# Patient Record
Sex: FEMALE | Race: WHITE | Employment: OTHER | URBAN - METROPOLITAN AREA
[De-identification: names, ages, dates, MRNs, and addresses within clinical notes are randomized per-mention and may not be internally consistent; named-entity substitution may affect disease eponyms.]

---

## 2020-11-11 ENCOUNTER — OFFICE VISIT (OUTPATIENT)
Dept: NEUROLOGY | Age: 55
End: 2020-11-11
Payer: COMMERCIAL

## 2020-11-11 VITALS — TEMPERATURE: 97.2 F

## 2020-11-11 DIAGNOSIS — F90.2 ATTENTION DEFICIT HYPERACTIVITY DISORDER (ADHD), COMBINED TYPE: ICD-10-CM

## 2020-11-11 DIAGNOSIS — F41.9 ANXIETY AND DEPRESSION: ICD-10-CM

## 2020-11-11 DIAGNOSIS — F32.A ANXIETY AND DEPRESSION: ICD-10-CM

## 2020-11-11 DIAGNOSIS — R41.3 MEMORY IMPAIRMENT OF GRADUAL ONSET: Primary | ICD-10-CM

## 2020-11-11 PROCEDURE — 96116 NUBHVL XM PHYS/QHP 1ST HR: CPT | Performed by: PSYCHOLOGIST

## 2020-11-11 NOTE — PROGRESS NOTES
This note will not be viewable in [employer] [employer] for the following reason(s). Likely risk of substantial harm from Misinterpretation of data generated by this evaluation. She will receive a full written copy of her report at the feedback session. [facility] at [address]    Office:  [phone]  Fax: [phone]                 Initial Office Exam    Patient Name: Cassi Mcpherson  Age: 48 y.o. Gender: female   Occupation:   Handedness: right handed   Presenting Concern: Memory loss  Primary Care Physician: Breanne Caraballo MD  Referring Provider: Norma Coleman MD      REASON FOR REFERRAL:  This comprehensive and medically necessary neuropsychological assessment was requested to assist with a differential diagnosis of memory loss. The use and purpose of this examination, as well as the extent and limitations of confidentiality, were explained prior to obtaining permission to participate. Instructions were provided regarding the necessity to put forth optimal effort and answer questions truthfully in order to obtain reliable and accurate test results. PERTINENT HISTORY:  Ms. Lambert Cornejo presented for a neuropsychological assessment at the recommendation of her treating physician secondary to complaints of gradual memory loss over the last five years. She has reported symptoms that include decreased concentration, not completing projects in a timely manner, increased use of post-a-notes, disorganization, increase anxiety and tension, difficulty with sleep, irritability, and decreased tolerance to frustration. Ms. Lambert Cornejo began noticing symptoms following 5 years ago.   From a brief review of her medical and personal history there has not been any other significant neurological injury or illness noted or reported. She did report experiencing depression or anxiety in the past.      Ms. Melchor Dinero does not  report any problems at birth or difficulties meeting developmental milestones. She reports that she had an ETOH family environment that was best characterized as abusve. She has a son who is dx. ADHD. Ms. Melchor Dinero does not  report being retain in school or receiving special assistance in any of she classes or subjects. Ms. Melchor Dinero completed 16 years of education. She is now work as a . Ms. Melchor Dinero does  exercise on a regular basis and does  maintain a balanced diet. She does  report problems with sleep and does  complain of pain. She does  participate in mentally stimulating activities. Ms. Melchor Dinero does not  have did not report specific concerns or stressors. Ms. Melchor Dinero indicated that she is independent in her instrumental activities of daily living, including shopping, meal preparation, housekeeping, doing laundry, driving a car, managing medications, and finances. No current outpatient medications on file. No current facility-administered medications for this visit. No past medical history on file. No flowsheet data found. No data recorded    No past surgical history on file. Social History     Socioeconomic History    Marital status:      Spouse name: Not on file    Number of children: Not on file    Years of education: Not on file    Highest education level: Not on file       No family history on file. CT Results (most recent):  No results found for this or any previous visit. MRI Results (most recent):  No results found for this or any previous visit.          MENTAL STATUS:    Orientation:  Fully oriented   Eye Contact:  Appropriate   Motor Behavior:   Ambulates independently   Speech:   Fluent and intelligible   Thought Process:  Goal-directed   Thought Content:  No evidence of hallucinations or delusions   Suicidal ideations:  Denies   Mood: Depressed and anxious mood   Affect:   Congruent with stated mood   Concentration:   Within normal limits   Abstraction:   Within normal limits   Insight:   Adequate     On the Modified Mini-Mental Status Exam: 89/100 (Low Average)      DIAGNOSTIC IMPRESSIONS:    ICD-10-CM ICD-9-CM    1. Memory impairment of gradual onset  R41.3 780.93    2. Anxiety and depression  F41.9 300.00     F32.9 311    3. Attention deficit hyperactivity disorder (ADHD), combined type -Provisional F90.2 314.01              PLAN:  1. Complete a comprehensive neuropsychological assessment to provide a differential diagnosis of presenting concerns as well as to assist with disposition and treatment planning as appropriate. 2. Consider compensatory and remedial cognitive training. 34994 x 1 Review of records. Face to face interview w/ patient. Determine test protocol: 60 minutes. Total 1 unit        Frankey Kinnier, PhD, ABPP, LCP  Licensed Clinical Psychologist/ Neuropsychologist        This note will not be viewable in [address]

## 2020-11-13 ENCOUNTER — OFFICE VISIT (OUTPATIENT)
Dept: NEUROLOGY | Age: 55
End: 2020-11-13
Payer: COMMERCIAL

## 2020-11-13 DIAGNOSIS — R41.89 COGNITIVE DECLINE: ICD-10-CM

## 2020-11-13 DIAGNOSIS — F32.A ANXIETY AND DEPRESSION: Primary | ICD-10-CM

## 2020-11-13 DIAGNOSIS — F90.2 ATTENTION DEFICIT HYPERACTIVITY DISORDER (ADHD), COMBINED TYPE: ICD-10-CM

## 2020-11-13 DIAGNOSIS — F34.1 DYSTHYMIC DISORDER: ICD-10-CM

## 2020-11-13 DIAGNOSIS — F41.9 ANXIETY AND DEPRESSION: Primary | ICD-10-CM

## 2020-11-13 PROCEDURE — 96132 NRPSYC TST EVAL PHYS/QHP 1ST: CPT | Performed by: PSYCHOLOGIST

## 2020-11-13 PROCEDURE — 96137 PSYCL/NRPSYC TST PHY/QHP EA: CPT | Performed by: PSYCHOLOGIST

## 2020-11-13 PROCEDURE — 96136 PSYCL/NRPSYC TST PHY/QHP 1ST: CPT | Performed by: PSYCHOLOGIST

## 2020-11-13 PROCEDURE — 96139 PSYCL/NRPSYC TST TECH EA: CPT | Performed by: PSYCHOLOGIST

## 2020-11-13 PROCEDURE — 96133 NRPSYC TST EVAL PHYS/QHP EA: CPT | Performed by: PSYCHOLOGIST

## 2020-11-13 PROCEDURE — 96138 PSYCL/NRPSYC TECH 1ST: CPT | Performed by: PSYCHOLOGIST

## 2020-11-13 NOTE — LETTER
11/19/20 Patient: Erlin Delgado YOB: 1965 Date of Visit: 11/13/2020 Eleanor Moore MD 
[facility] Facsimile: [phone] Dear Eleanor Moore MD, Thank you for referring Ms. Erlin Delgado to [address] [place] [address] for evaluation. My notes for this consultation are attached. If you have questions, please do not hesitate to call me. I look forward to following your patient along with you.  
 
 
Sincerely, 
 
Abelardo Heard, PHD

## 2020-11-17 NOTE — PROGRESS NOTES
This note will not be viewable in [employer] for the following reason(s). Likely risk of substantial harm from Misinterpretation of data generated by this evaluation. She will receive a full written copy of her report at the feedback session. [facility] at [address]    Office:  [phone]  Fax: [phone]                                            Neuropsychological Evaluation Report      Patient Name: Charley Savage  Age: 48 y.o. Gender: female   Occupation:   Handedness: right handed   Presenting Concern: Memory loss  Primary Care Physician: Maddi Alcantar MD  Referring Provider: Amparo Mary MD       PATIENT HISTORY (OBTAINED DURING INITIAL CLINICAL EVALUATION):    REASON FOR REFERRAL:  This comprehensive and medically necessary neuropsychological assessment was requested to assist with a differential diagnosis of memory loss. The use and purpose of this examination, as well as the extent and limitations of confidentiality, were explained prior to obtaining permission to participate. Instructions were provided regarding the necessity to put forth optimal effort and answer questions truthfully in order to obtain reliable and accurate test results.     PERTINENT HISTORY:  Ms. James Ga presented for a neuropsychological assessment at the recommendation of her treating physician secondary to complaints of gradual memory loss over the last five years. She has reported symptoms that include decreased concentration, not completing projects in a timely manner, increased use of post-a-notes, disorganization, increase anxiety and tension, difficulty with sleep, irritability, and decreased tolerance to frustration. Ms. James Ga began noticing symptoms following 5 years ago.   From a brief review of her medical and personal history there has not been any other significant neurological injury or illness noted or reported. She did report experiencing depression or anxiety in the past.       Ms. James Ga does not  report any problems at birth or difficulties meeting developmental milestones. She reports that she had an ETOH family environment that was best characterized as abusve. She has a son who is dx. ADHD. Ms. James Ga does not  report being retain in school or receiving special assistance in any of she classes or subjects. Ms. James Ga completed 16 years of education. She is now work as a .     Ms. James Ga does  exercise on a regular basis and does  maintain a balanced diet. She does  report problems with sleep and does  complain of pain. She does  participate in mentally stimulating activities. Ms. James Ga does not  have did not report specific concerns or stressors. Ms. James Ga indicated that she is independent in her instrumental activities of daily living, including shopping, meal preparation, housekeeping, doing laundry, driving a car, managing medications, and finances. METHODS OF ASSESSMENT (Current Evaluation):  Clinician Administered:  Clinical Interview  Review of Medical Records  Clock Drawing Task  Modified Mini-Mental Status Exam (3MS)  Test of Premorbid Functioning  Locke Depression Inventory-2 (BDI-2)        State-Trait Anxiety Inventory (STAXI)  Personality Assessment Inventory (CRYSTAL)  Revised Memory and Behavior Checklist    Technician Administered:   CNS Vital Signs (CNS-VS)  Neuropsychological Assessment Battery 1   NAB: Attention Module   NAB: Executive Functions Module   NAB: Language Module   NAB: Memory Module   NAB: Spatial Module  Paced Auditory Serial Addition Task  Structured Inventory of Malingering Symptoms (FIGUEROA)  Test of Memory Malingering  Test of Practical Judgment (9-Item)  Trail Making Test    TEST OBSERVATIONS:  Ms. James Ga arrived promptly for the testing session.   Dress and grooming were appropriate; physical presentation was unchanged from that observed during the clinical interview. Speech was fluent, intelligible, and goal-directed. Affect was congruent with the euthymic mood conveyed. Ms. Duane jeter was adequately cooperative and appeared to put forth good effort throughout this examination. Rapport with the examiner was adequately established and maintained. Minimal prompting was required. Comprehension of test instructions was not problematic. Performance motivation was objectively measured by three instruments (TOMM, FIGUEROA, Reliable Digit Span), and Ms. Duane jeter produced a normal score on two of these measures. Accordingly, test findings below are of questionable validity. There are also indications of inconsistent effort and the increased possibility of distortion on subjective measures. Given the above observations, plus comments contained in the Mental Status section, the results should not be considered conclusive. TEST RESULTS:  Quantitative test results are derived from comparisons to age and education corrected cohort normative data, where applicable. Percentiles are included in these instances. Qualitative test results are determined using clinical observations. General Orientation and Awareness:       Orientation to person, year, month, day of month, day of week, state, town, and circumstance.    Awareness of deficits: Decreased                  Cognitive performance validity testing:  Questionable validity        Attention/Concentration:      Classification:  Simple visuomotor tracking (<0.1 percentile)               Severely Impaired  Digits forward (8 percentile)                 Mildly Impaired  Digits backward (46 percentile)                 Average  Visual scanning (1 percentile)                            Severely Impaired  Simple information processing  efficiency (<1 percentile)  Severely Impaired   Complex information processing efficiency (<1 percentile)  Severely Impaired  Attention to visual detail of driving scenes (<1 percentile)             Severely Impaired   PASAT 200 (74 percentile)      Average          CNS -VS:   Invalid    Visuospatial and Constructional Praxis:     Visual discrimination (4 percentile)                            Moderately Impaired   Design construction (27 percentile)                 Average  Figure drawing copy (99 percentile)                                                  Very Superior     Language:         Expressive speech in oral production (10 percentile)       Low Average  Auditory comprehension (69 percentile)                   Average   Naming (<1 percentile)          Severely Impaired  Reading comprehension (50 percentile)        Average   Writing (54 percentile)                     Average   Bill payment task (50 percentile)                    Average    Memory and Learning:       Word list immediate recall (18 percentile)                Low Average  Word list short delayed recall (18 percentile)                Low Average  Word list long delayed recall (1 percentile)                           Severely Impaired  Shape learning immediate recognition (8 percentile)    Mildly Impaired    Shape learning delayed recognition (34 percentile)               Average  Story learning immediate recall (4 percentile)                Moderately Impaired  Story learning delayed recall (1 percentile)                           Severely Impaired  Daily living memory immediate recall (4 percentile)               Moderately Impaired  Daily living memory delayed recall (<1 percentile)               Severely Impaired    Cognitive Tests of Executive Functioning:     Trail Making B (0.4 percentile)                  Severely Impaired  Mazes (14 percentile)                   Low Average  Simple judgment in daily decision making (10 percentile)              Low Average  Complex judgment in daily decision making (1 percentile)               Severely Impaired  Categories (12 percentile)                  Low Average  Word generation (62 percentile)                             Average      Intellectual and Basic Cognitive Functioning (WAIS-IV):  ACS Test of pre-morbid functioning reading recognition lower limit estimated WAIS-IV FSIQ score:       Estimated full scale IQ:                  89 percentile       High Average     Emotional Functioning:   BDI-2:   28    Moderate Depression   STAXI:   58   Significant Anxiety     CRYSTAL:    Ms. Nito Montano was administered a CRYSTAL at the time of her evaluation to determine her emotional status. Examination of the validity scales indicated indicators falling outside of the normal range suggesting that she may not have answered in a completely forthright matter leading to in accurate impression based on her style of responding. Her response pattern contains a number of subtle indications that she may have been hesitant to acknowledge limits to her capabilities and she may deny any dissatisfaction, distress, or undesirable consequences that might arise from such limitations. This tendency could lead her to make an effort to downplay or minimize areas which are less than optimal.  While this defensiveness may not necessarily impair the interpretability of her test results, it does raise the possibility of some distortion. Her clinical profile suggest noteworthy for carry peculiarities in her thinking and experience. Her pattern of responses suggest that her thought processes are likely to be marked by confusion, indecision, distractibility and difficulty concentrating. Additionally she is likely to have an activity level that is perceptibly high to most observers. She may be involved in a wide variety of activities in a somewhat disorganized manner and may be experiencing accelerated thought processes. There is evidence of difficulties consistent with mild levels of depression as well as anxiety and stress.   She may be seen by others as somewhat of a perfectionist.  She is likely to be fairly rigid individual who follows her personal guidelines for conduct in an inflexible and unyielding manner. She ruminates about matters to the degree that she is often has difficulty making decisions and perceiving the larger significance of decisions that are made. Changes in routine, unexpected events, and contradictory information are likely to generate untoward stress. IMPRESSIONS:  Ms. Melchor Dinero was seen for a comprehensive neuropsychological evaluation and administered a battery of measures assessing the neurocognitive domains of attention, visual-spatial, language, memory, and executive functioning. Examination of a variety of effort and validity measures indicate that Ms. Melchor Dinero may not have approached this assessment and the most optimal manner. There are indications of inconsistencies in her effort, overreporting of symptoms and in some cases underreporting limits or functioning that might be less than optimal.  As such there is questionable validity to this assessment. There is clear indications of difficulties in her thought processes that are marked by confusion, indecision, distractibility, and difficulty concentrating. These appeared to be the result of emotional distress, rather than result of an organic dysfunction. Her overall level of cognitive performance was in the moderately impaired range. This, unfortunately, is marked by several difficulties in her performance. On the CNS vital signs (a computer-assisted battery of neuropsychological measures) she invalidated all of the measures. On measures of attention and concentration she was adequately able to perform on measures of working memory and rapid information processing, but was significantly impaired on other measures of information processing, visual attention to detail, and visual scanning. This is an unusual and inconsistent finding.   Her visual-spatial skills overall were in the average range, but again inconsistent findings were noted.  On a basic visual discrimination task she she performed in the moderately impaired range, but on constructional praxis tasks she performed in the very superior range and average. As these measures are highly correlated and the disparity in performance seems somewhat atypical.  Her performance on measures of language functioning were in the average range with all but one measure (confrontational naming) falling in the low average to average range. Her low confrontational naming performance again is unusual given her college education. Her performance on measures of memory functioning was significantly impaired. This was especially surprising given that she continues to be actively employed as a . It is unclear how she would be able to function in this position with her level of performance on these measures. On measures of executive functioning, Ms. Mao performed in the average range on measures of visual planning, simple judgment, categorical reasoning, and word fluency. In contrast, her performance on measures of cognitive flexibility and complex judgment in decision making was severely impaired. Again, given her performance as , it seems inconsistent that she could function effectively and be severely impaired in these areas. DIAGNOSTIC IMPRESSIONS:    ICD-10-CM ICD-9-CM    1. Anxiety and depression  F41.9 300.00 LA NEUROPSYCHOLOGICAL TST EVAL PHYS/QHP 1ST HOUR    F32.9 311 LA NEUROPSYCHOLOGICAL TST EVAL PHYS/QHP EA ADDL HR      LA PSYL/NRPSYCL TST PHYS/QHP 2+ TST 1ST 30 MIN      LA PSYCL/NRPSYCL TST PHYS/QHP 2+ TST EA ADDL 30 MIN      LA PSYCL/NRPSYCL TST PHYS/QHP 2+ TST EA ADDL 30 MIN      LA PSYCL/NRPSYCL TST TECH 2+ TST 1ST 30 MIN   2.  Attention deficit hyperactivity disorder (ADHD), combined type, not supported F90.2 314.01 LA NEUROPSYCHOLOGICAL TST EVAL PHYS/QHP 1ST HOUR      LA NEUROPSYCHOLOGICAL TST EVAL PHYS/QHP EA ADDL HR      LA PSYL/NRPSYCL TST PHYS/QHP 2+ TST 1ST 30 MIN      MI PSYCL/NRPSYCL TST PHYS/QHP 2+ TST EA ADDL 30 MIN      MI PSYCL/NRPSYCL TST PHYS/QHP 2+ TST EA ADDL 30 MIN      MI PSYCL/NRPSYCL TST TECH 2+ TST 1ST 30 MIN      MI PSYCL/NRPSYCL TST TECH 2+ TST EA ADDL 30 MIN      MI PSYCL/NRPSYCL TST TECH 2+ TST EA ADDL 30 MIN      MI PSYCL/NRPSYCL TST TECH 2+ TST EA ADDL 30 MIN      MI PSYCL/NRPSYCL TST TECH 2+ TST EA ADDL 30 MIN      MI PSYCL/NRPSYCL TST TECH 2+ TST EA ADDL 30 MIN   3. Dysthymic disorder  F34.1 300.4 MI NEUROPSYCHOLOGICAL TST EVAL PHYS/QHP 1ST HOUR      MI NEUROPSYCHOLOGICAL TST EVAL PHYS/QHP EA ADDL HR      MI PSYL/NRPSYCL TST PHYS/QHP 2+ TST 1ST 30 MIN      MI PSYCL/NRPSYCL TST PHYS/QHP 2+ TST EA ADDL 30 MIN      MI PSYCL/NRPSYCL TST PHYS/QHP 2+ TST EA ADDL 30 MIN      MI PSYCL/NRPSYCL TST TECH 2+ TST 1ST 30 MIN   4. Cognitive decline  R41.89 294.9 MI NEUROPSYCHOLOGICAL TST EVAL PHYS/QHP 1ST HOUR      MI NEUROPSYCHOLOGICAL TST EVAL PHYS/QHP EA ADDL HR      MI PSYL/NRPSYCL TST PHYS/QHP 2+ TST 1ST 30 MIN      MI PSYCL/NRPSYCL TST PHYS/QHP 2+ TST EA ADDL 30 MIN      MI PSYCL/NRPSYCL TST PHYS/QHP 2+ TST EA ADDL 30 MIN      MI PSYCL/NRPSYCL TST TECH 2+ TST 1ST 30 MIN      MI PSYCL/NRPSYCL TST TECH 2+ TST EA ADDL 30 MIN      MI PSYCL/NRPSYCL TST TECH 2+ TST EA ADDL 30 MIN      MI PSYCL/NRPSYCL TST TECH 2+ TST EA ADDL 30 MIN      MI PSYCL/NRPSYCL TST TECH 2+ TST EA ADDL 30 MIN      MI PSYCL/NRPSYCL TST TECH 2+ TST EA ADDL 30 MIN   R/O Possible Mixed Personality Disorder      RECOMMENDATIONS:   1. Findings should be reviewed with Ms. Mao to insure her understanding and discuss the potential implications. 2. Emphasis should be placed on Ms. Doran Dane obtaining good sleep hygiene and maintaining adequate physical exercise to promote good brain health. 3. Ms. Andreea Rivera may benefit from a referral to Psychiatry and Clinical Psychology for psychotherapy to address anxiety/ depression issues.         Thank you for allowing me the opportunity to assist you in Ms. Mao's care. Please do not hesitate to contact me should you have additional questions that I may not have addressed. 76706 x 1  96138 x 1  96139 x 6  96132 x 1  96133 x 2          Noy Quintero, Ph.D., ABPP  Licensed Clinical Psychologist  Neuropsychologist  Board Certified Rehabilitation Psychologist      Time Documentation:     86850 x 1: Neurobehavioral Status Exam/Clinical Interview: (1 hour, (already billed on first date of service)     17964*3 Neuropsych testing/data gathering by Neuropsychologist (35 additional minutes, see above)      96138 x 1  96139 x 6 Test Administration/Data Gathering By Technician: (3.5 hours). 78438 x 1 (first 30 minutes), 87295 x 7 (each additional 30 minutes)     96132 x 1  96133 x 1 Testing Evaluation Services by Neuropsychologist (1 hour, 50 minutes) 96132 x 1 (first hour), 96133 x 1 (50 minutes)     Definitions:       21649/44202:  Neurobehavioral Status Exam, Clinical interview. Clinical assessment of thinking, reasoning and judgment, by neuropsychologist, both face to face time with patient and time interpreting those test results and reporting, first and subsequent hours)     38000/72880: Neuropsychological Test Administration by Technician/Psychometrist, first 30 minutes and each additional 30 minutes. The above includes: Record review. Review of history provided by patient. Review of collaborative information. Testing by Clinician. Review of raw data. Scoring. Report writing of individual tests administered by Clinician. Integration of individual tests administered by psychometrist with NSE/testing by clinician, review of records/history/collaborative information, case Conceptualization, treatment planning, clinical decision making, report writing, coordination Of Care.  Psychometry test codes as time spent by psychometrist administering and scoring neurocognitive/psychological tests under supervision of neuropsychologist.  Integral services including scoring of raw data, data interpretation, case conceptualization, report writing etcetera were initiated after the patient finished testing/raw data collected and was completed on the date the report was signed. This note will not be viewable in [address]

## 2020-12-02 ENCOUNTER — TELEPHONE (OUTPATIENT)
Dept: NEUROLOGY | Age: 55
End: 2020-12-02

## 2020-12-02 NOTE — TELEPHONE ENCOUNTER
----- Message from Karlo Sadler sent at 12/2/2020  2:27 PM EST -----  Regarding: Dr. Quintero/ Telephone  General Message/Vendor Calls    Caller's first and last name: Patient      Reason for call: Test Results      Callback required yes/no and why: Yes.  To advise      Best contact number(s): [phone]      Details to clarify the request:Patient would like a call back to advise if an appointment should be scheduled to discuss results      Lena Sadler

## 2021-01-04 ENCOUNTER — OFFICE VISIT (OUTPATIENT)
Dept: NEUROLOGY | Age: 56
End: 2021-01-04
Payer: COMMERCIAL

## 2021-01-04 DIAGNOSIS — F41.9 ANXIETY AND DEPRESSION: Primary | ICD-10-CM

## 2021-01-04 DIAGNOSIS — R41.89 COGNITIVE DECLINE: ICD-10-CM

## 2021-01-04 DIAGNOSIS — F34.1 DYSTHYMIC DISORDER: ICD-10-CM

## 2021-01-04 DIAGNOSIS — F32.A ANXIETY AND DEPRESSION: Primary | ICD-10-CM

## 2021-01-04 PROCEDURE — 90832 PSYTX W PT 30 MINUTES: CPT | Performed by: PSYCHOLOGIST

## 2021-01-04 NOTE — PROGRESS NOTES
Davon Daigle is a 48 y.o. female who presents today for feedback following recent neuropsychological testing. The results were reviewed of the recent neuropsychological evaluation, including discussing individual tests as well as the patient's areas of neurocognitive strength versus their weaknesses. Education was provided regarding my diagnostic impressions, and we discussed next steps for further evaluation down the road. I also answered numerous questions related to the clinical findings. There appears to be a significant emotional overlay to her cognitive deficits, and I feel that re-engaging in psychotherapy with someone she trusts would be beneficial.  The patient is encouraged to follow-up with the referring provider. DIAGNOSTIC IMPRESSIONS:    ICD-10-CM ICD-9-CM    1. Anxiety and depression  F41.9 300.00     F32.9 311    2. Dysthymic disorder  F34.1 300.4    3.  Cognitive decline  R41.89 294.9        09032 30 minutes x 1    Danial Kirby, PHD